# Patient Record
Sex: MALE
[De-identification: names, ages, dates, MRNs, and addresses within clinical notes are randomized per-mention and may not be internally consistent; named-entity substitution may affect disease eponyms.]

---

## 2019-04-08 ENCOUNTER — HOSPITAL ENCOUNTER (INPATIENT)
Dept: HOSPITAL 92 - ERS | Age: 54
LOS: 4 days | Discharge: HOME | DRG: 419 | End: 2019-04-12
Attending: SURGERY | Admitting: SURGERY
Payer: SELF-PAY

## 2019-04-08 VITALS — BODY MASS INDEX: 25.5 KG/M2

## 2019-04-08 DIAGNOSIS — E80.7: ICD-10-CM

## 2019-04-08 DIAGNOSIS — R58: ICD-10-CM

## 2019-04-08 DIAGNOSIS — K81.0: Primary | ICD-10-CM

## 2019-04-08 DIAGNOSIS — Z87.891: ICD-10-CM

## 2019-04-08 LAB
ALBUMIN SERPL BCG-MCNC: 4.6 G/DL (ref 3.5–5)
ALP SERPL-CCNC: 103 U/L (ref 40–150)
ALT SERPL W P-5'-P-CCNC: 28 U/L (ref 8–55)
ANION GAP SERPL CALC-SCNC: 14 MMOL/L (ref 10–20)
AST SERPL-CCNC: 28 U/L (ref 5–34)
BASOPHILS # BLD AUTO: 0.1 THOU/UL (ref 0–0.2)
BASOPHILS NFR BLD AUTO: 0.4 % (ref 0–1)
BILIRUB SERPL-MCNC: 0.9 MG/DL (ref 0.2–1.2)
BUN SERPL-MCNC: 10 MG/DL (ref 8.4–25.7)
CALCIUM SERPL-MCNC: 9.8 MG/DL (ref 7.8–10.44)
CHLORIDE SERPL-SCNC: 100 MMOL/L (ref 98–107)
CO2 SERPL-SCNC: 25 MMOL/L (ref 22–29)
CREAT CL PREDICTED SERPL C-G-VRATE: 0 ML/MIN (ref 70–130)
CRYSTAL-AUWI FLAG: 0.2 (ref 0–15)
EOSINOPHIL # BLD AUTO: 0.1 THOU/UL (ref 0–0.7)
EOSINOPHIL NFR BLD AUTO: 0.6 % (ref 0–10)
GLOBULIN SER CALC-MCNC: 3.4 G/DL (ref 2.4–3.5)
GLUCOSE SERPL-MCNC: 125 MG/DL (ref 70–105)
HEV IGM SER QL: 3.6 (ref 0–7.99)
HGB BLD-MCNC: 15.8 G/DL (ref 14–18)
HYALINE CASTS #/AREA URNS LPF: (no result) LPF
LIPASE SERPL-CCNC: 11 U/L (ref 8–78)
LYMPHOCYTES # BLD: 2.7 THOU/UL (ref 1.2–3.4)
LYMPHOCYTES NFR BLD AUTO: 17.1 % (ref 21–51)
MCH RBC QN AUTO: 29.9 PG (ref 27–31)
MCV RBC AUTO: 89.8 FL (ref 78–98)
MONOCYTES # BLD AUTO: 1.9 THOU/UL (ref 0.11–0.59)
MONOCYTES NFR BLD AUTO: 11.7 % (ref 0–10)
NEUTROPHILS # BLD AUTO: 11.2 THOU/UL (ref 1.4–6.5)
NEUTROPHILS NFR BLD AUTO: 70.1 % (ref 42–75)
PATHC CAST-AUWI FLAG: 1.49 (ref 0–2.49)
PLATELET # BLD AUTO: 296 THOU/UL (ref 130–400)
POTASSIUM SERPL-SCNC: 3.3 MMOL/L (ref 3.5–5.1)
PROT UR STRIP.AUTO-MCNC: 30 MG/DL
RBC # BLD AUTO: 5.28 MILL/UL (ref 4.7–6.1)
RBC UR QL AUTO: (no result) HPF (ref 0–3)
SODIUM SERPL-SCNC: 136 MMOL/L (ref 136–145)
SP GR UR STRIP: 1.02 (ref 1–1.04)
SPERM-AUWI FLAG: 0 (ref 0–9.9)
WBC # BLD AUTO: 16 THOU/UL (ref 4.8–10.8)
WBC UR QL AUTO: (no result) HPF (ref 0–3)
YEAST-AUWI FLAG: 0 (ref 0–25)

## 2019-04-08 PROCEDURE — S0028 INJECTION, FAMOTIDINE, 20 MG: HCPCS

## 2019-04-08 PROCEDURE — 71045 X-RAY EXAM CHEST 1 VIEW: CPT

## 2019-04-08 PROCEDURE — 85018 HEMOGLOBIN: CPT

## 2019-04-08 PROCEDURE — 93005 ELECTROCARDIOGRAM TRACING: CPT

## 2019-04-08 PROCEDURE — 81003 URINALYSIS AUTO W/O SCOPE: CPT

## 2019-04-08 PROCEDURE — 86901 BLOOD TYPING SEROLOGIC RH(D): CPT

## 2019-04-08 PROCEDURE — 81015 MICROSCOPIC EXAM OF URINE: CPT

## 2019-04-08 PROCEDURE — 84484 ASSAY OF TROPONIN QUANT: CPT

## 2019-04-08 PROCEDURE — 86900 BLOOD TYPING SEROLOGIC ABO: CPT

## 2019-04-08 PROCEDURE — 80053 COMPREHEN METABOLIC PANEL: CPT

## 2019-04-08 PROCEDURE — 85014 HEMATOCRIT: CPT

## 2019-04-08 PROCEDURE — 86850 RBC ANTIBODY SCREEN: CPT

## 2019-04-08 PROCEDURE — 76705 ECHO EXAM OF ABDOMEN: CPT

## 2019-04-08 PROCEDURE — 83880 ASSAY OF NATRIURETIC PEPTIDE: CPT

## 2019-04-08 PROCEDURE — 78226 HEPATOBILIARY SYSTEM IMAGING: CPT

## 2019-04-08 PROCEDURE — C9113 INJ PANTOPRAZOLE SODIUM, VIA: HCPCS

## 2019-04-08 PROCEDURE — 85025 COMPLETE CBC W/AUTO DIFF WBC: CPT

## 2019-04-08 PROCEDURE — 96365 THER/PROPH/DIAG IV INF INIT: CPT

## 2019-04-08 PROCEDURE — A9537 TC99M MEBROFENIN: HCPCS

## 2019-04-08 PROCEDURE — 83690 ASSAY OF LIPASE: CPT

## 2019-04-08 PROCEDURE — 36415 COLL VENOUS BLD VENIPUNCTURE: CPT

## 2019-04-08 PROCEDURE — 74177 CT ABD & PELVIS W/CONTRAST: CPT

## 2019-04-08 PROCEDURE — 88304 TISSUE EXAM BY PATHOLOGIST: CPT

## 2019-04-08 PROCEDURE — 80076 HEPATIC FUNCTION PANEL: CPT

## 2019-04-08 NOTE — RAD
XR Chest 1 View Portable



History: [Chest pain]



Comparison: Radiograph 2003



Findings: The lungs are clear. No pneumothorax or effusion. Cardiac silhouette and mediastinal contou
rs are within normal limits.



Impression: No acute intrathoracic abnormality.



Reported By: Isidro Pierce 

Electronically Signed:  4/8/2019 2:34 PM

## 2019-04-08 NOTE — CT
CT abdomen and pelvis with IV contrast



HISTORY: Abdominal pain. Nausea and vomiting.



FINDINGS: The lung bases are clear. The gallbladder is elongated at 8.6 cm. Hyperdense stone within t
he fundus. There is thickening of the gallbladder wall and a small amount of pericholecystic fluid. R
emainder of the biliary system is decompressed. Small cysts within the liver are similar to the prior
 exam.



Urinary bladder is decompressed. No enlarged lymph nodes or free fluid elsewhere within the abdomen. 
There are degenerative changes of lumbar spine.







IMPRESSION: Cholelithiasis with an inflamed appearance of the gallbladder. Acute cholecystitis is dariela
pected. Please correlate clinically.



Reported By: MARYCARMEN Rosen 

Electronically Signed:  4/8/2019 4:11 PM

## 2019-04-08 NOTE — ULT
US Gallbladder RUQ



History: [Abdominal pain]



Comparison: CT examination same day



Findings: Real-time grayscale and color evaluation of the right upper quadrant of the abdomen was per
formed. Pancreas is unremarkable. Hepatic echotexture is normal.



Portal vein is patent with antegrade flow. Mild pericholecystic fluid and wall thickening. There is c
holelithiasis. Right kidney is normal. Abnormal thickening of the gallbladder fundus.



Impression: 

1. Abnormally thickened wall the gallbladder pericholecystic fluid concerning for acute cholecystitis
.

2. Abnormal thickening of the gallbladder fundus can be seen with mass versus adenomyosis although on
 the CT examination there is a calculus in this location. Surgical consultation is advised.



Reported By: Isidro Pierce 

Electronically Signed:  4/8/2019 5:31 PM

## 2019-04-09 LAB — HGB BLD-MCNC: 12 G/DL (ref 14–18)

## 2019-04-09 PROCEDURE — 0FT44ZZ RESECTION OF GALLBLADDER, PERCUTANEOUS ENDOSCOPIC APPROACH: ICD-10-PCS | Performed by: SURGERY

## 2019-04-09 RX ADMIN — Medication SCH: at 21:36

## 2019-04-09 RX ADMIN — Medication SCH: at 09:03

## 2019-04-09 RX ADMIN — FAMOTIDINE SCH MG: 10 INJECTION, SOLUTION INTRAVENOUS at 19:43
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
## 2019-04-10 LAB
ALBUMIN SERPL BCG-MCNC: 3.1 G/DL (ref 3.5–5)
ALP SERPL-CCNC: 163 U/L (ref 40–150)
ALT SERPL W P-5'-P-CCNC: 163 U/L (ref 8–55)
ANION GAP SERPL CALC-SCNC: 10 MMOL/L (ref 10–20)
AST SERPL-CCNC: 134 U/L (ref 5–34)
BASOPHILS # BLD AUTO: 0 THOU/UL (ref 0–0.2)
BASOPHILS NFR BLD AUTO: 0.2 % (ref 0–1)
BILIRUB SERPL-MCNC: 5.7 MG/DL (ref 0.2–1.2)
BUN SERPL-MCNC: 13 MG/DL (ref 8.4–25.7)
CALCIUM SERPL-MCNC: 8.3 MG/DL (ref 7.8–10.44)
CHLORIDE SERPL-SCNC: 106 MMOL/L (ref 98–107)
CO2 SERPL-SCNC: 25 MMOL/L (ref 22–29)
CREAT CL PREDICTED SERPL C-G-VRATE: 105 ML/MIN (ref 70–130)
EOSINOPHIL # BLD AUTO: 0 THOU/UL (ref 0–0.7)
EOSINOPHIL NFR BLD AUTO: 0.2 % (ref 0–10)
GLOBULIN SER CALC-MCNC: 2.3 G/DL (ref 2.4–3.5)
GLUCOSE SERPL-MCNC: 110 MG/DL (ref 70–105)
HGB BLD-MCNC: 10 G/DL (ref 14–18)
HGB BLD-MCNC: 8.6 G/DL (ref 14–18)
HGB BLD-MCNC: 8.7 G/DL (ref 14–18)
HGB BLD-MCNC: 9.1 G/DL (ref 14–18)
LIPASE SERPL-CCNC: 10 U/L (ref 8–78)
LYMPHOCYTES # BLD: 1.7 THOU/UL (ref 1.2–3.4)
LYMPHOCYTES NFR BLD AUTO: 12.6 % (ref 21–51)
MCH RBC QN AUTO: 31.2 PG (ref 27–31)
MCV RBC AUTO: 91.7 FL (ref 78–98)
MONOCYTES # BLD AUTO: 1.6 THOU/UL (ref 0.11–0.59)
MONOCYTES NFR BLD AUTO: 11.7 % (ref 0–10)
NEUTROPHILS # BLD AUTO: 10.3 THOU/UL (ref 1.4–6.5)
NEUTROPHILS NFR BLD AUTO: 75.3 % (ref 42–75)
PLATELET # BLD AUTO: 210 THOU/UL (ref 130–400)
POTASSIUM SERPL-SCNC: 4.1 MMOL/L (ref 3.5–5.1)
RBC # BLD AUTO: 2.93 MILL/UL (ref 4.7–6.1)
SODIUM SERPL-SCNC: 137 MMOL/L (ref 136–145)
WBC # BLD AUTO: 13.7 THOU/UL (ref 4.8–10.8)

## 2019-04-10 RX ADMIN — FAMOTIDINE SCH: 10 INJECTION, SOLUTION INTRAVENOUS at 23:18

## 2019-04-10 RX ADMIN — FAMOTIDINE SCH MG: 10 INJECTION, SOLUTION INTRAVENOUS at 08:30

## 2019-04-10 RX ADMIN — Medication SCH ML: at 20:17

## 2019-04-10 RX ADMIN — Medication SCH ML: at 08:30

## 2019-04-10 NOTE — OP
DATE OF PROCEDURE:  04/09/2019



PREOPERATIVE DIAGNOSIS:  Acute cholecystitis.



PROCEDURE PERFORMED:  Laparoscopic cholecystectomy.



INDICATIONS:  A 53-year-old male, who came in with a 2-day history of severe right

upper quadrant pain.  CT and ultrasound showed thickened gallbladder wall,

leukocytosis. 



FINDINGS:  Distended, thickened walled gallbladder consistent with acute

cholecystitis. 



DESCRIPTION OF PROCEDURE:  After informed consent was obtained, the patient was

taken to the operating room, given general endotracheal anesthesia, placed in the

supine position.  Abdomen was prepped and draped in usual fashion.  Local anesthesia

was infiltrated subcutaneously and deep.  A subumbilical incision was performed.

Subcu was divided sharply.  The fascia was grasped and two stay sutures of 0 Vicryl

was placed in each side of midline.  Midline incised.  Digital palpation revealed no

local adhesions.  A blunt 12 mm trocar was inserted.  Pneumoperitoneum was created

to a pressure of 15 mmHg.  A 0-degree laparoscope was inserted under direct vision.

Three 5 mm ports were placed subcostally.  The gallbladder had to be aspirated 60 mL

of old hemorrhagic bilious fluid removed from the gallbladder.  The gallbladder was

then able to be grasped, advanced superiorly.  The peritoneum lysed, dissected

distally to expose the cystic duct artery in critical view.  The duct and artery

were triply ligated with hemoclips and divided.  The gallbladder was removed from

its fossa utilizing electrocautery, removed from the abdomen in an endosac through

the umbilical port.  Hemostasis assured with electrocautery.  A drain was placed and

brought out through the lateral-most incision, sutured in place.  Hemostasis

assured.  Trocars and retractors were removed.  The fascia was closed with

interrupted 2-0 Vicryl.  The skin was closed with interrupted 4-0 Rapide.  Dermabond

applied.  The patient tolerated the procedure well, transferred to Recovery in good

condition.  Sponge and needle count verified correct x2. 







Job ID:  [id number]

## 2019-04-10 NOTE — PRG
DATE OF SERVICE:  04/10/2019



SUBJECTIVE:  The patient says he feels fine.  Minimal pain.  No nausea or vomiting.



OBJECTIVE:  VITAL SIGNS:  His temperature is 98, pulse 78, blood pressure 112/70. 

GENERAL:  He is awake, alert, in no apparent distress. 

HEENT:  No jaundice. 

LUNGS:  Clear. 

HEART:  Regular rate and rhythm. 

ABDOMEN:  Soft, nondistended.  The incisions look good, however, his H and H has

been drifting down from 12 to 9.1.  He was put in quite a bit of bloody fluid out of

his ARIANA drain with a total of 615 mL.  It is slowed down. 



ASSESSMENT:  Postoperative bleeding, slowing down.



PLAN:  We will keep him n.p.o.  Continue to follow H and H.  If it continues to

drop, may need reexploration. 







Job ID:  [id number]

## 2019-04-11 LAB
ALBUMIN SERPL BCG-MCNC: 2.9 G/DL (ref 3.5–5)
ALP SERPL-CCNC: 160 U/L (ref 40–150)
ALT SERPL W P-5'-P-CCNC: 120 U/L (ref 8–55)
AST SERPL-CCNC: 71 U/L (ref 5–34)
BASOPHILS # BLD AUTO: 0.1 THOU/UL (ref 0–0.2)
BASOPHILS # BLD AUTO: 0.1 THOU/UL (ref 0–0.2)
BASOPHILS NFR BLD AUTO: 0.6 % (ref 0–1)
BASOPHILS NFR BLD AUTO: 0.9 % (ref 0–1)
BILIRUB DIRECT SERPL-MCNC: 1.2 MG/DL (ref 0.1–0.3)
BILIRUB SERPL-MCNC: 1.8 MG/DL (ref 0.2–1.2)
EOSINOPHIL # BLD AUTO: 0.1 THOU/UL (ref 0–0.7)
EOSINOPHIL # BLD AUTO: 0.2 THOU/UL (ref 0–0.7)
EOSINOPHIL NFR BLD AUTO: 1.5 % (ref 0–10)
EOSINOPHIL NFR BLD AUTO: 1.8 % (ref 0–10)
HGB BLD-MCNC: 7.8 G/DL (ref 14–18)
HGB BLD-MCNC: 8.1 G/DL (ref 14–18)
HGB BLD-MCNC: 8.6 G/DL (ref 14–18)
LYMPHOCYTES # BLD: 2.7 THOU/UL (ref 1.2–3.4)
LYMPHOCYTES # BLD: 2.7 THOU/UL (ref 1.2–3.4)
LYMPHOCYTES NFR BLD AUTO: 29.6 % (ref 21–51)
LYMPHOCYTES NFR BLD AUTO: 31.2 % (ref 21–51)
MCH RBC QN AUTO: 31 PG (ref 27–31)
MCH RBC QN AUTO: 31.4 PG (ref 27–31)
MCV RBC AUTO: 90.7 FL (ref 78–98)
MCV RBC AUTO: 91.4 FL (ref 78–98)
MONOCYTES # BLD AUTO: 0.7 THOU/UL (ref 0.11–0.59)
MONOCYTES # BLD AUTO: 0.9 THOU/UL (ref 0.11–0.59)
MONOCYTES NFR BLD AUTO: 10.2 % (ref 0–10)
MONOCYTES NFR BLD AUTO: 7.8 % (ref 0–10)
NEUTROPHILS # BLD AUTO: 4.9 THOU/UL (ref 1.4–6.5)
NEUTROPHILS # BLD AUTO: 5.5 THOU/UL (ref 1.4–6.5)
NEUTROPHILS NFR BLD AUTO: 56.5 % (ref 42–75)
NEUTROPHILS NFR BLD AUTO: 59.9 % (ref 42–75)
PLATELET # BLD AUTO: 199 THOU/UL (ref 130–400)
PLATELET # BLD AUTO: 217 THOU/UL (ref 130–400)
RBC # BLD AUTO: 2.51 MILL/UL (ref 4.7–6.1)
RBC # BLD AUTO: 2.59 MILL/UL (ref 4.7–6.1)
WBC # BLD AUTO: 8.7 THOU/UL (ref 4.8–10.8)
WBC # BLD AUTO: 9.1 THOU/UL (ref 4.8–10.8)

## 2019-04-11 RX ADMIN — Medication SCH ML: at 20:16

## 2019-04-11 RX ADMIN — FAMOTIDINE SCH MG: 10 INJECTION, SOLUTION INTRAVENOUS at 08:23

## 2019-04-11 RX ADMIN — Medication SCH ML: at 08:23

## 2019-04-11 RX ADMIN — FAMOTIDINE SCH: 10 INJECTION, SOLUTION INTRAVENOUS at 20:16

## 2019-04-11 NOTE — NM
HIDA SCAN:



Date:  04/11/19



HISTORY:  Elevated bilirubin after cholecystectomy.



TECHNIQUE/FINDINGS:



Radiation  dosimetry is 4.5 mCi of Tc 99m Choletec.



Dynamic 1 hour imaging was performed.



Images demonstrates uptake of the of the radioisotope with constipation seen in the biliary system. 



There is prompt passage of the isotope into the duodenum. No evidence of extravasation of contrast se
en outside of the biliary system and small bowel.



IMPRESSION:

No evidence of radioisotope leak.



Transcribed Date/Time: 4/11/2019 11:20 AM



Reported By: Usman Reddy 

Electronically Signed:  4/11/2019 12:04 PM

## 2019-04-12 VITALS — TEMPERATURE: 98.1 F | DIASTOLIC BLOOD PRESSURE: 78 MMHG | SYSTOLIC BLOOD PRESSURE: 127 MMHG

## 2019-04-12 LAB
ALBUMIN SERPL BCG-MCNC: 3.1 G/DL (ref 3.5–5)
ALP SERPL-CCNC: 145 U/L (ref 40–150)
ALT SERPL W P-5'-P-CCNC: 110 U/L (ref 8–55)
AST SERPL-CCNC: 50 U/L (ref 5–34)
BASOPHILS # BLD AUTO: 0.1 THOU/UL (ref 0–0.2)
BASOPHILS NFR BLD AUTO: 0.7 % (ref 0–1)
BILIRUB DIRECT SERPL-MCNC: 0.8 MG/DL (ref 0.1–0.3)
BILIRUB SERPL-MCNC: 1.5 MG/DL (ref 0.2–1.2)
EOSINOPHIL # BLD AUTO: 0.2 THOU/UL (ref 0–0.7)
EOSINOPHIL NFR BLD AUTO: 2.6 % (ref 0–10)
HGB BLD-MCNC: 8.2 G/DL (ref 14–18)
LYMPHOCYTES # BLD: 1.8 THOU/UL (ref 1.2–3.4)
LYMPHOCYTES NFR BLD AUTO: 21.3 % (ref 21–51)
MCH RBC QN AUTO: 31.8 PG (ref 27–31)
MCV RBC AUTO: 89.2 FL (ref 78–98)
MONOCYTES # BLD AUTO: 0.7 THOU/UL (ref 0.11–0.59)
MONOCYTES NFR BLD AUTO: 8.2 % (ref 0–10)
NEUTROPHILS # BLD AUTO: 5.8 THOU/UL (ref 1.4–6.5)
NEUTROPHILS NFR BLD AUTO: 67.2 % (ref 42–75)
PLATELET # BLD AUTO: 243 THOU/UL (ref 130–400)
RBC # BLD AUTO: 2.58 MILL/UL (ref 4.7–6.1)
WBC # BLD AUTO: 8.6 THOU/UL (ref 4.8–10.8)

## 2019-04-12 RX ADMIN — FAMOTIDINE SCH: 10 INJECTION, SOLUTION INTRAVENOUS at 08:48

## 2019-04-12 RX ADMIN — Medication SCH ML: at 08:48
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
## 2019-04-12 NOTE — PRG
DATE OF SERVICE:  04/12/2019



SUBJECTIVE:  The patient is feeling much better.  Denies pain.  He is eating a

regular diet.  No nausea or vomiting. 



PHYSICAL EXAMINATION:

VITAL SIGNS:  Temperature 98.2, pulse 79, blood pressure 119/73. 

GENERAL:  He looks good. 

ABDOMEN:  Soft, nondistended, nontender.



LABORATORY DATA:  His H and H are stable at 8.2 and 23.  His bilirubin is still

little elevated at 1.5.  AST and ALT are coming down.  The HIDA scan did not show

any obstruction. 



PLAN:  Per GI, if they think it is fine to go home, we will discharge home.







Job ID:  [id number]

## 2019-04-12 NOTE — DIS
DATE OF ADMISSION:  04/08/2019



DATE OF DISCHARGE:  04/12/2019



DISCHARGE DIAGNOSIS:  Acute cholecystitis.



PROCEDURES DURING ADMISSION:  Laparoscopic cholecystectomy, postoperative HIDA scan.



HOSPITAL COURSE:  The patient was admitted, given IV antibiotics, taken to the

operating room where he underwent a laparoscopic cholecystectomy, was found to have

a very inflamed gallbladder.  Postoperatively, he had some postop bleeding with his

hemoglobin dropping to as low as 8.2, is now stable.  A drain was left.  His initial

bilirubin at postop was also elevated at 5.7.  GI was consulted.  A HIDA scan showed

no leak and free flow into the duodenum.  His LFTs have gone down significantly to

1.4.  He feels fine.  He is tolerating a regular diet.  He is discharged home on

hydrocodone and Zofran, and will continue the drain.  He will follow up with me on

Tuesday for drain removal. 







Job ID:  [id number]

## 2019-04-13 NOTE — EKG
Test Reason : EPIGASTRIC PAIN

Blood Pressure : ***/*** mmHG

Vent. Rate : 076 BPM     Atrial Rate : 076 BPM

   P-R Int : 138 ms          QRS Dur : 086 ms

    QT Int : 366 ms       P-R-T Axes : 053 -10 015 degrees

   QTc Int : 411 ms

 

Normal sinus rhythm with sinus arrhythmia

Normal ECG

 

Confirmed by KAL ARAMBULA ([id number]),  DANIEL FLETCHER ([id number]) on 4/13/2019 3:30:47 PM

 

Referred By:             Confirmed By:KAL ARAMBULA

## 2019-04-15 NOTE — PQF
[employer] Winform Viewer



SHREYAPETEY MOSES MARTINEZ JR, MD

[id number]                                                             

[id number]                             

                                   

CLINICAL DOCUMENTATION CLARIFICATION FORM:  POST DISCHARGE



Addendum to original discharge summary date:  __________________________________
____



Late entry note date:  _________________________________________________________
__











DATE: 04-15-19                                     ATTN: Moses Rehman Jr.



Please exercise your independent, professional judgment in responding to the 
clarification form. 

Clinical indicators are provided on the bottom of this form for your review



Can you please specify the post-operative bleeding?



Please check appropriate box(s):

[  ] post-operative bleeding is a complication of recent laparoscopic 
cholecystectomy

[  ] post-operative bleeding is not a complication of recent laparoscopic 
cholecystectomy

[  ] post-operative bleeding is an expected/normal from recent laparoscopic 
cholecystectomy

[  ] Other diagnosis, please specify: ____________

[  ] Unable to determine





CLINICAL INDICATORS:



H&P 04/09 pg2 by Dr. Bowman - I have discussed the planned procedure as well as 
risk of bleeding, infection, injury to bowel, injury to bile duct, need to open

Operative Note 04/09 pg1 by Dr. Bowman - The galbladder had to be aspirated 60 
ml of old hemorrhagic bilious fluid removed from the galbladder.

PN 04/10 pg1 by Dr. Bowman - The incision look good however, his H and H has 
been drifting down from 12 to 9.1. He was put in quite bit of bloody fluid out 
of his ARIANA drain with a total of 615 ml. It is slowed down.

PN 04/10 pg1 by Dr. Bowman - Assessment: Postoperative bleeding, slowing down

PN 04/11 pg1 by Dr. Lee - 53-year-old  male who underwent 
laparoscopic cholecstectomy by Dr. Moses Bowman. Apparently, the patient had some 
bleeding from the surgery and has a drain, which is draining blood.

DS 04/12 pg1 by Dr. Bowman - He had some postop bleeding with his hemoglobin 
dropping to as low as 8.2, is now stable. A drain was left 

DS 04/12 pg1 by Dr. Bowman - he underwent a laparoscopic cholecystectomy, was 
found to have a very inflamed gallbladder. Postoperatively, he had some postop 
bleeding with his hemoglobin dropping to as low as 8.2 is now stable.

Laboratory result 4/11  Hgb 7.5 L, Hct 22.7 L







RISK FACTORS:



Laparoscopic cholecystectomy- Operative Note 04/09 pg1 by Dr. Bowman

Hip and knee surgery- H&P 04/09 pg1 by Dr. Bowman





TREATMENT:



Continue to follow H and H- PN 04/10 pg1 by Dr. Bowman

Hepatobiliary scan nuclear medicine - 04/11 By Dr. Reddy

Sodium Chloride 0.9% IV-MAR 04/08

Hemostasis assured with electrocautery  Operative Note 04/09 pg1 by Dr. Bowman







(This form is maintained as a part of the permanent medical record)

2015 [employer].  All Rights Reserved

[email or web address]    [not provided]

                                                              



MTDD

## 2019-04-15 NOTE — PRG
DATE OF SERVICE:  04/12/2019



SUBJECTIVE:  This is a 53-year-old  male, who had laparoscopic

cholecystectomy three days ago. The patient apparently had some bleeding issues due

to the procedure and had a drain placed. He was having quite a bit of bleeding

throughout the day until yesterday. The bleeding had  __________ stopped. Blood

count has been pretty stable around 7.8 to 8.2. He was seen by me because of

abnormal LFTs, possibility of common bile duct stone. However, he has abdominal pain

and nausea. He is tolerating diet and he has postprandial abdominal pain and nausea.

His LFTs are actually trending down. Today, CBC; WBC 8600; hemoglobin is 8.2,

stable; hematocrit 23. Liver function test; bilirubin was 5.7 has dropped to 1.5,

AST dropping to 50 from 134,  from 163, alkaline phosphatase 145 from 163. 



PHYSICAL EXAMINATION:

GENERAL: He appears very comfortable. He has nausea and tolerating diet. 

VITAL SIGNS: Afebrile. Pulse is 93, blood pressure 127/78. 

HEENT: Conjunctivae clear. 

CARDIOVASCULAR: Within normal limits. 

LUNGS: Within normal limits. 

ABDOMEN: Soft. Abdomen is mildly tender from the incision site. There is no rebound

or guarding. 



CLINICAL IMPRESSION:  Elevated LFTs, mostly represent some _________ from bleeding

or possibly passed a stone. Either way he is asymptomatic and LFTs are trending

down.  I see no reason for  ERCP. I did discuss this with Dr. Eliud Bowman and

explained that the patient can be discharged home today. The patient will call if he

does have problems. 







Job ID:  [id number]